# Patient Record
Sex: MALE | Race: WHITE | URBAN - METROPOLITAN AREA
[De-identification: names, ages, dates, MRNs, and addresses within clinical notes are randomized per-mention and may not be internally consistent; named-entity substitution may affect disease eponyms.]

---

## 2017-08-31 ENCOUNTER — TELEPHONE (OUTPATIENT)
Dept: FAMILY MEDICINE CLINIC | Facility: CLINIC | Age: 37
End: 2017-08-31

## 2017-08-31 NOTE — TELEPHONE ENCOUNTER
Pt's appt note stated to obtain pt's Insurance. Call to pt to collect his insurance info and pt advised me that he has insurance but pt is out of network and is aware there will be a fee at time of Service.  Pt did not want to go to any of his in-network p

## 2017-09-07 NOTE — PROGRESS NOTES
Tena Wade is a 30year old male. CHIEF COMPLAINT   Anxiety, re-establish care  HPI:   Takes propranolol prior to speaking.   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 The patient is asked to return prn. Call if medication refills needed. Continue counseling.

## 2017-09-09 PROBLEM — F41.8 SITUATIONAL ANXIETY: Status: ACTIVE | Noted: 2017-09-09

## 2017-09-09 PROBLEM — F41.9 ANXIETY: Status: ACTIVE | Noted: 2017-09-09

## 2017-09-09 PROBLEM — G43.909 MIGRAINE WITHOUT STATUS MIGRAINOSUS, NOT INTRACTABLE: Status: ACTIVE | Noted: 2017-09-09

## 2018-12-07 ENCOUNTER — TELEPHONE (OUTPATIENT)
Dept: FAMILY MEDICINE CLINIC | Facility: CLINIC | Age: 38
End: 2018-12-07

## 2018-12-07 NOTE — TELEPHONE ENCOUNTER
Medical Records Request received from [facility] for all records. Patient has only been seen for 1 OV. Notes printed and faxed to [phone] on 12/7/18. Release also sent to be scanned to patients chart.

## 2022-06-21 ENCOUNTER — TELEPHONE (OUTPATIENT)
Dept: FAMILY MEDICINE CLINIC | Facility: CLINIC | Age: 42
End: 2022-06-21

## 2022-06-21 NOTE — TELEPHONE ENCOUNTER
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 However, since he lives in [place], he would technically need to drive [place] the border to [place] for me to do virtual (unless this rule has changed and I'm not aware of it).

## 2022-06-21 NOTE — TELEPHONE ENCOUNTER
Call to pt's cell. Reaches identified VM. LVM requesting call back to triage nurse tomorrow for feedback on the virtual visit appt request. Provided call back number and ofc phone hours.

## 2022-06-22 NOTE — TELEPHONE ENCOUNTER
S/W pt and he was not sure if able to do visit in [place]. Informed pt that the scope of providers practice does not go beyond [facility]. Pt voiced understanding and states he will think about it and will call back.

## 2023-03-03 ENCOUNTER — TELEPHONE (OUTPATIENT)
Dept: FAMILY MEDICINE CLINIC | Facility: CLINIC | Age: 43
End: 2023-03-03

## 2023-03-14 ENCOUNTER — PATIENT OUTREACH (OUTPATIENT)
Dept: CASE MANAGEMENT | Age: 43
End: 2023-03-14

## 2023-03-14 NOTE — PROCEDURES
The office order for PCP removal request is Approved and finalized on March 14, 2023.     Thanks,  [facility] [employer] Other Specify